# Patient Record
Sex: FEMALE | Race: BLACK OR AFRICAN AMERICAN
[De-identification: names, ages, dates, MRNs, and addresses within clinical notes are randomized per-mention and may not be internally consistent; named-entity substitution may affect disease eponyms.]

---

## 2017-06-04 ENCOUNTER — HOSPITAL ENCOUNTER (EMERGENCY)
Dept: HOSPITAL 63 - ER | Age: 37
Discharge: HOME | End: 2017-06-04
Payer: COMMERCIAL

## 2017-06-04 VITALS — WEIGHT: 187 LBS | HEIGHT: 64 IN | BODY MASS INDEX: 31.92 KG/M2

## 2017-06-04 VITALS — SYSTOLIC BLOOD PRESSURE: 137 MMHG | DIASTOLIC BLOOD PRESSURE: 89 MMHG

## 2017-06-04 DIAGNOSIS — R42: Primary | ICD-10-CM

## 2017-06-04 DIAGNOSIS — E03.9: ICD-10-CM

## 2017-06-04 DIAGNOSIS — I10: ICD-10-CM

## 2017-06-04 LAB
AMPHETAMINE/METHAMPHETAMINE: (no result)
ANION GAP SERPL CALC-SCNC: 9 MMOL/L (ref 6–14)
APTT PPP: YELLOW S
BACTERIA #/AREA URNS HPF: (no result) /HPF
BARBITURATES UR-MCNC: (no result) UG/ML
BASOPHILS # BLD AUTO: 0.1 X10^3/UL (ref 0–0.2)
BASOPHILS NFR BLD: 1 % (ref 0–3)
BENZODIAZ UR-MCNC: (no result) UG/L
BILIRUB UR QL STRIP: (no result)
CA-I SERPL ISE-MCNC: 12 MG/DL (ref 7–20)
CALCIUM SERPL-MCNC: 8.7 MG/DL (ref 8.5–10.1)
CANNABINOIDS UR-MCNC: (no result) UG/L
CHLORIDE SERPL-SCNC: 105 MMOL/L (ref 98–107)
CO2 SERPL-SCNC: 25 MMOL/L (ref 21–32)
COCAINE UR-MCNC: (no result) NG/ML
CREAT SERPL-MCNC: 0.9 MG/DL (ref 0.6–1)
EOSINOPHIL NFR BLD: 0.1 X10^3/UL (ref 0–0.7)
EOSINOPHIL NFR BLD: 2 % (ref 0–3)
ERYTHROCYTE [DISTWIDTH] IN BLOOD BY AUTOMATED COUNT: 14.7 % (ref 11.5–14.5)
FIBRINOGEN PPP-MCNC: (no result) MG/DL
GFR SERPLBLD BASED ON 1.73 SQ M-ARVRAT: 84.8 ML/MIN
GLUCOSE SERPL-MCNC: 104 MG/DL (ref 70–99)
GLUCOSE UR STRIP-MCNC: (no result) MG/DL
HCT VFR BLD CALC: 31.5 % (ref 36–47)
HGB BLD-MCNC: 10.6 G/DL (ref 12–15.5)
LYMPHOCYTES # BLD: 1.7 X10^3/UL (ref 1–4.8)
LYMPHOCYTES NFR BLD AUTO: 33 % (ref 24–48)
MCH RBC QN AUTO: 30 PG (ref 25–35)
MCHC RBC AUTO-ENTMCNC: 34 G/DL (ref 31–37)
MCV RBC AUTO: 88 FL (ref 79–100)
METHADONE SERPL-MCNC: (no result) NG/ML
MONO #: 0.3 X10^3/UL (ref 0–1.1)
MONOCYTES NFR BLD: 6 % (ref 0–9)
NEUT #: 3 X10^3UL (ref 1.8–7.7)
NEUTROPHILS NFR BLD AUTO: 59 % (ref 31–73)
NITRITE UR QL STRIP: (no result)
OPIATES UR-MCNC: (no result) NG/ML
PCP SERPL-MCNC: (no result) MG/DL
PLATELET # BLD AUTO: 238 X10^3/UL (ref 140–400)
POTASSIUM SERPL-SCNC: 3.5 MMOL/L (ref 3.5–5.1)
RBC # BLD AUTO: 3.58 X10^6/UL (ref 3.5–5.4)
RBC #/AREA URNS HPF: 0 /HPF (ref 0–2)
SODIUM SERPL-SCNC: 139 MMOL/L (ref 136–145)
SP GR UR STRIP: 1.02
SQUAMOUS #/AREA URNS LPF: (no result) /LPF
UROBILINOGEN UR-MCNC: 0.2 MG/DL
WBC # BLD AUTO: 5.1 X10^3/UL (ref 4–11)
WBC #/AREA URNS HPF: (no result) /HPF (ref 0–4)

## 2017-06-04 PROCEDURE — 96360 HYDRATION IV INFUSION INIT: CPT

## 2017-06-04 PROCEDURE — 81025 URINE PREGNANCY TEST: CPT

## 2017-06-04 PROCEDURE — 36415 COLL VENOUS BLD VENIPUNCTURE: CPT

## 2017-06-04 PROCEDURE — 80305 DRUG TEST PRSMV DIR OPT OBS: CPT

## 2017-06-04 PROCEDURE — 84484 ASSAY OF TROPONIN QUANT: CPT

## 2017-06-04 PROCEDURE — 80048 BASIC METABOLIC PNL TOTAL CA: CPT

## 2017-06-04 PROCEDURE — 81001 URINALYSIS AUTO W/SCOPE: CPT

## 2017-06-04 PROCEDURE — 85027 COMPLETE CBC AUTOMATED: CPT

## 2017-06-04 PROCEDURE — 93005 ELECTROCARDIOGRAM TRACING: CPT

## 2017-06-04 PROCEDURE — 71020: CPT

## 2017-06-04 PROCEDURE — 80320 DRUG SCREEN QUANTALCOHOLS: CPT

## 2017-06-04 NOTE — EKG
[facility] [address], [place] [address]

Test Date:    2017               Test Time:    13:51:40

Pat Name:     TASHA BLANCO          Department:   

Patient ID:   [id number]           Room:          

Gender:       F                        Technician:   MIO

:          1980               Requested By: ANGIE LANTIGUA

Order Number: [id number]            Reading MD:   Ag Cool

                                 Measurements

Intervals                              Axis          

Rate:         64                       P:            43

HI:           130                      QRS:          58

QRSD:         80                       T:            30

QT:           396                                    

QTc:          408                                    

                           Interpretive Statements

SINUS RHYTHM

NONSPECIFIC ST-T WAVE CHANGES.

RI6.01          Unconfirmed report

Compared to ECG 2015 21:01:39

No significant changes



Electronically Signed On 2017 9:53:05 CDT by Ag Cool

## 2017-06-04 NOTE — ED.ADGEN
Past History


Past Medical History:  Hypertension, Hyperthyroid


Past Surgical History:  


Alcohol Use:  None


Drug Use:  None





Adult General


HPI


HPI





Patient is a 37-year-old woman, history of hypertension is medically managed, 

who presents emergency Department with a complaint of lightheadedness. Patient 

states that she become increasingly lightheaded are the past 3 days. She states 

that the sensation is worse with standing and moving from a lying to seated 

position. She states that she has remained well-hydrated, denies any chest pain

, any shortness of breath, any nausea or vomiting, any focal weakness, numbness

, tingling, vision changes, travel, procedures, ingestions, exposures, swelling 

extremities, rashes or other concerning symptoms. States that this is similar 

to when she was diagnosed with high blood pressure, however blood pressure hasn'

t well controlled with oral medication. She states she is eating and drinking 

well. Denies any seasonal allergy or respiratory type symptoms. Denies any drugs

, alcohol or cigarettes. Patient states that there is a possibility she could 

be pregnant.





Review of Systems


Review of Systems





Constitutional: Denies fever or chills [] lightheadedness.


Eyes: Denies change in visual acuity, redness, or eye pain []


HENT: Denies nasal congestion or sore throat []


Respiratory: Denies cough or shortness of breath []


Cardiovascular: No additional information not addressed in HPI []


GI: Denies abdominal pain, nausea, vomiting, bloody stools or diarrhea []


: Denies dysuria or hematuria []


Musculoskeletal: Denies back pain or joint pain []


Integument: Denies rash or skin lesions []


Neurologic: Denies headache, focal weakness or sensory changes []


Endocrine: Denies polyuria or polydipsia []





Current Medications


Current Medications





Current Medications








 Medications


  (Trade)  Dose


 Ordered  Sig/Eliseo  Start Time


 Stop Time Status Last Admin


Dose Admin


 


 Sodium Chloride  1,000 ml @ 


 1,000 mls/hr  1X  ONCE  17 13:45


 17 14:44 DC 17 14:15


1,000 MLS/HR











Allergies


Allergies





Allergies








Coded Allergies Type Severity Reaction Last Updated Verified


 


  No Known Drug Allergies    3/3/15 No











Physical Exam


Physical Exam





Constitutional: Well developed, well nourished, no acute distress, non-toxic 

appearance. []


HENT: Normocephalic, atraumatic, bilateral external ears normal, oropharynx 

moist, no oral exudates, nose normal. []


Eyes: PERRLA, EOMI, conjunctiva normal, no discharge. [] 


Neck: Normal range of motion, no tenderness, supple, no stridor. [] 


Cardiovascular:Heart rate regular rhythm, no murmur, S1, S2, no rubs or 

gallops. []


Lungs & Thorax:  Bilateral breath sounds clear to auscultation , no wheezing, 

rhonchi, rales. No chest or crepitus or tenderness. []


Abdomen: Bowel sounds normal, soft, no tenderness, no masses, no pulsatile 

masses. [] 


Skin: Warm, dry, no erythema, no rash. [] 


Back: No tenderness, no CVA tenderness. [] 


Extremities: No tenderness, no cyanosis, no clubbing, ROM intact, no edema. 

Negative Homans sign. [] 


Neurologic: Alert and oriented X 3, normal motor function, normal sensory 

function, no focal deficits noted. []


Psychologic: Affect normal, judgement normal, mood normal. []





Current Patient Data


Vital Signs





 Vital Signs








  Date Time  Temp Pulse Resp B/P (MAP) Pulse Ox O2 Delivery O2 Flow Rate FiO2


 


17 15:10  65 18 137/89 (105) 100 Room Air  


 


17 12:55 98.5       








Lab Results





 Laboratory Tests








Test


  17


13:00 17


13:55


 


Urine Collection Type Unknown   


 


Urine Color Yellow   


 


Urine Clarity Hazy   


 


Urine pH 5.5   


 


Urine Specific Gravity 1.020   


 


Urine Protein


  Trace


(NEG-TRACE) 


 


 


Urine Glucose (UA)


  Neg mg/dL


(NEG) 


 


 


Urine Ketones (Stick)


  Trace mg/dL


(NEG) 


 


 


Urine Blood Trace (NEG)   


 


Urine Nitrite Neg (NEG)   


 


Urine Bilirubin Neg (NEG)   


 


Urine Urobilinogen Dipstick


  0.2 mg/dL (0.2


mg/dL) 


 


 


Urine Leukocyte Esterase Neg (NEG)   


 


Urine RBC 0 /HPF (0-2)   


 


Urine WBC


  Rare /HPF


(0-4) 


 


 


Urine Squamous Epithelial


Cells Few /LPF  


  


 


 


Urine Bacteria


  Few /HPF


(0-FEW) 


 


 


Urine Mucus Mod /LPF   


 


Urine Opiates Screen Neg (NEG)   


 


Urine Methadone Screen Neg (NEG)   


 


Urine Barbiturates Neg (NEG)   


 


Urine Phencyclidine Screen Neg (NEG)   


 


Urine


Amphetamine/Methamphetamine Neg (NEG)  


  


 


 


Urine Benzodiazepines Screen Neg (NEG)   


 


Urine Cocaine Screen Neg (NEG)   


 


Urine Cannabinoids Screen Pos (NEG)   


 


Urine Ethyl Alcohol Neg (NEG)   


 


White Blood Count


  


  5.1 x10^3/uL


(4.0-11.0)


 


Red Blood Count


  


  3.58 x10^6/uL


(3.50-5.40)


 


Hemoglobin


  


  10.6 g/dL


(12.0-15.5)  L


 


Hematocrit


  


  31.5 %


(36.0-47.0)  L


 


Mean Corpuscular Volume


  


  88 fL ()


 


 


Mean Corpuscular Hemoglobin  30 pg (25-35)  


 


Mean Corpuscular Hemoglobin


Concent 


  34 g/dL


(31-37)


 


Red Cell Distribution Width


  


  14.7 %


(11.5-14.5)  H


 


Platelet Count


  


  238 x10^3/uL


(140-400)


 


Neutrophils (%) (Auto)  59 % (31-73)  


 


Lymphocytes (%) (Auto)  33 % (24-48)  


 


Monocytes (%) (Auto)  6 % (0-9)  


 


Eosinophils (%) (Auto)  2 % (0-3)  


 


Basophils (%) (Auto)  1 % (0-3)  


 


Neutrophils # (Auto)


  


  3.0 x10^3uL


(1.8-7.7)


 


Lymphocytes # (Auto)


  


  1.7 x10^3/uL


(1.0-4.8)


 


Monocytes # (Auto)


  


  0.3 x10^3/uL


(0.0-1.1)


 


Eosinophils # (Auto)


  


  0.1 x10^3/uL


(0.0-0.7)


 


Basophils # (Auto)


  


  0.1 x10^3/uL


(0.0-0.2)


 


Sodium Level


  


  139 mmol/L


(136-145)


 


Potassium Level


  


  3.5 mmol/L


(3.5-5.1)


 


Chloride Level


  


  105 mmol/L


()


 


Carbon Dioxide Level


  


  25 mmol/L


(21-32)


 


Anion Gap  9 (6-14)  


 


Blood Urea Nitrogen


  


  12 mg/dL


(7-20)


 


Creatinine


  


  0.9 mg/dL


(0.6-1.0)


 


Estimated GFR


(Cockcroft-Gault) 


  84.8  


 


 


Glucose Level


  


  104 mg/dL


(70-99)  H


 


Calcium Level


  


  8.7 mg/dL


(8.5-10.1)


 


Troponin I Quantitative


  


  < 0.017 ng/mL


(0-0.055)











EKG


EKG


EC: Sinus rhythm, heart rate 64 bpm, upright axis, QTC of 408, SD of 1:30

, QRS of 80, contrary normalities noted in the anterior septal leads, but no ST 

elevations or depressions, some flattening in lead 3 also noted, does not meet 

STEMI criteria. As interpreted by me. []





Radiology/Procedures


Radiology/Procedures


[]


Impressions:


[facility] [address], [place] [address]


 [phone]


 


 IMAGING REPORT





 Signed





PATIENT: TASHA BLANCO ACCOUNT: [id number] MRN#: [id number]


: 1980 LOCATION: ER AGE: 37


SEX: F EXAM DT: 17 ACCESSION#: [id number]


STATUS: PRE ER ORD. PHYSICIAN: ANGIE LANTIGUA DO 


REASON: Dizziness


PROCEDURE: CHEST PA & LATERAL





Indication: Dizziness and high blood pressure.





Time of exam 1411 hours.





FINDINGS: The heart size is normal. The lungs are clear. No pleural effusion


or pneumothorax is identified.  The pulmonary vascularity is normal.





IMPRESSION: No acute abnormality detected. 




















DICTATED AND SIGNED BY:     TAY BANKS MD


DATE:     17





CC: ANGIE LANTIGUA DO; NIECY LEWIS MD ~





Course & Med Decision Making


Course & Med Decision Making


Pertinent Labs and Imaging studies reviewed. (See chart for details)





HCG is negative and the emergency department. Patient complaining of near-

syncope, although she has no reports of chest pain, shortness of breath, or 

other concerning personal or family history regarding a concerning cardiac or 

pulmonary cause. However after discussion at bedside, due to the length patient'

s symptoms will obtain laboratory studies, chest x-ray, ECG and administer IV 

fluids. Patient with positive orthostatics in the emergency department, so 

liter of normal saline, laboratory studies and imaging not reveal any evidence 

of concerning findings, after receiving the liter of fluid patient states that 

she is feeling much better, is no longer experiencing lightheadedness. 

Discussed with her that she also has some evidence of mild seasonal allergy 

symptoms, recommend use of over-the-counter medications such as fluticasone or 

loratadine to treat and prevent symptoms, and address the importance of staying 

well-hydrated, drink plenty of clear fluids. Patient voiced understanding and 

agreement with these instructions, states she is ready to go home at this time, 

is ambulating without difficulty and without recurrence of symptoms in the ED. 

States that she will follow-up with her primary care provider for additional 

evaluation as needed, and will return to the ED for concerning symptoms as 

discussed us. Patient discharged home in stable condition with plan as above.





Final Impression


Final Impression


[]


Problems:  


(1) Lightheadedness





Dragon Disclaimer


Dragon Disclaimer


This electronic medical record was generated, in whole or in part, using a 

voice recognition dictation system.











ANGIE LANTIGUA DO 2017 19:02

## 2017-06-04 NOTE — RAD
Indication: Dizziness and high blood pressure.



Time of exam 1411 hours.



FINDINGS: The heart size is normal. The lungs are clear. No pleural effusion

or pneumothorax is identified.  The pulmonary vascularity is normal.



IMPRESSION: No acute abnormality detected.

## 2017-06-08 ENCOUNTER — HOSPITAL ENCOUNTER (EMERGENCY)
Dept: HOSPITAL 63 - ER | Age: 37
Discharge: HOME | End: 2017-06-08
Payer: COMMERCIAL

## 2017-06-08 VITALS — DIASTOLIC BLOOD PRESSURE: 80 MMHG | SYSTOLIC BLOOD PRESSURE: 139 MMHG

## 2017-06-08 VITALS — HEIGHT: 64 IN | BODY MASS INDEX: 28.51 KG/M2 | WEIGHT: 167 LBS

## 2017-06-08 DIAGNOSIS — K52.9: Primary | ICD-10-CM

## 2017-06-08 LAB
ALBUMIN SERPL-MCNC: 3.6 G/DL (ref 3.4–5)
ALBUMIN/GLOB SERPL: 0.8 {RATIO} (ref 1–1.7)
ALP SERPL-CCNC: 82 U/L (ref 46–116)
ALT SERPL-CCNC: 21 U/L (ref 14–59)
ANION GAP SERPL CALC-SCNC: 9 MMOL/L (ref 6–14)
AST SERPL-CCNC: 13 U/L (ref 15–37)
BASOPHILS # BLD AUTO: 0.1 X10^3/UL (ref 0–0.2)
BASOPHILS NFR BLD: 1 % (ref 0–3)
BILIRUB SERPL-MCNC: 0.1 MG/DL (ref 0.2–1)
BUN/CREAT SERPL: 18 (ref 6–20)
CA-I SERPL ISE-MCNC: 14 MG/DL (ref 7–20)
CALCIUM SERPL-MCNC: 8.8 MG/DL (ref 8.5–10.1)
CHLORIDE SERPL-SCNC: 105 MMOL/L (ref 98–107)
CO2 SERPL-SCNC: 24 MMOL/L (ref 21–32)
CREAT SERPL-MCNC: 0.8 MG/DL (ref 0.6–1)
EOSINOPHIL NFR BLD: 0 % (ref 0–3)
EOSINOPHIL NFR BLD: 0 X10^3/UL (ref 0–0.7)
ERYTHROCYTE [DISTWIDTH] IN BLOOD BY AUTOMATED COUNT: 14.2 % (ref 11.5–14.5)
GFR SERPLBLD BASED ON 1.73 SQ M-ARVRAT: 97.1 ML/MIN
GLOBULIN SER-MCNC: 4.6 G/DL (ref 2.2–3.8)
GLUCOSE SERPL-MCNC: 112 MG/DL (ref 70–99)
HCT VFR BLD CALC: 33.8 % (ref 36–47)
HGB BLD-MCNC: 11.4 G/DL (ref 12–15.5)
LIPASE: 141 U/L (ref 73–393)
LYMPHOCYTES # BLD: 1.2 X10^3/UL (ref 1–4.8)
LYMPHOCYTES NFR BLD AUTO: 15 % (ref 24–48)
MCH RBC QN AUTO: 30 PG (ref 25–35)
MCHC RBC AUTO-ENTMCNC: 34 G/DL (ref 31–37)
MCV RBC AUTO: 88 FL (ref 79–100)
MONO #: 0.3 X10^3/UL (ref 0–1.1)
MONOCYTES NFR BLD: 3 % (ref 0–9)
NEUT #: 6.4 X10^3UL (ref 1.8–7.7)
NEUTROPHILS NFR BLD AUTO: 81 % (ref 31–73)
PLATELET # BLD AUTO: 287 X10^3/UL (ref 140–400)
POTASSIUM SERPL-SCNC: 3.6 MMOL/L (ref 3.5–5.1)
PROT SERPL-MCNC: 8.2 G/DL (ref 6.4–8.2)
RBC # BLD AUTO: 3.85 X10^6/UL (ref 3.5–5.4)
SODIUM SERPL-SCNC: 138 MMOL/L (ref 136–145)
WBC # BLD AUTO: 8 X10^3/UL (ref 4–11)

## 2017-06-08 PROCEDURE — 36415 COLL VENOUS BLD VENIPUNCTURE: CPT

## 2017-06-08 PROCEDURE — 99285 EMERGENCY DEPT VISIT HI MDM: CPT

## 2017-06-08 PROCEDURE — 96361 HYDRATE IV INFUSION ADD-ON: CPT

## 2017-06-08 PROCEDURE — 85027 COMPLETE CBC AUTOMATED: CPT

## 2017-06-08 PROCEDURE — S0028 INJECTION, FAMOTIDINE, 20 MG: HCPCS

## 2017-06-08 PROCEDURE — 96375 TX/PRO/DX INJ NEW DRUG ADDON: CPT

## 2017-06-08 PROCEDURE — 83690 ASSAY OF LIPASE: CPT

## 2017-06-08 PROCEDURE — 80053 COMPREHEN METABOLIC PANEL: CPT

## 2017-06-08 PROCEDURE — 84484 ASSAY OF TROPONIN QUANT: CPT

## 2017-06-08 PROCEDURE — 74022 RADEX COMPL AQT ABD SERIES: CPT

## 2017-06-08 PROCEDURE — 96374 THER/PROPH/DIAG INJ IV PUSH: CPT

## 2017-06-08 PROCEDURE — 81025 URINE PREGNANCY TEST: CPT

## 2017-06-08 NOTE — PHYS DOC
General


Chief Complaint:  ABDOMINAL PAIN


Stated Complaint:  N/V/D


Time Seen by MD:  10:24


Source:  patient


Exam Limitations:  no limitations


Problems:  





History of Present Illness


Initial Comments


Patient is a 37-year-old female who comes the ED complaining of nausea vomiting 

and diarrhea.


Patient states she had a pizza frozen pop or James's her son may last night 

which she didn't think was "all the way through. She her son and some others 

ate a cheesy only one ill today. She states she woke up this morning with 

epigastric discomfort and nausea, states she went to the restroom and had 

multiple episodes of diarrhea and vomiting. States she's had at least 15 

episodes of loose watery stools she seen no blood in her stools or emesis. She 

denies focal abdominal pain complaints just generalized discomfort typically 

relieved with bowel movement or emesis. No fever chills sweats or myalgias no 

travel or other exotic or uncooked by mouth. No pre-arrival treatment patient 

is not vomiting and has no loose stools to this point in the ED.


Timing/Duration:  24 hours


Severity:  moderate


Modifying Factors:  worse with eating, improves with medication


Associated Symptoms:  nausea/vomiting


Allergies:  


Coded Allergies:  


     No Known Drug Allergies (Unverified , 3/3/15)





Past Medical History


Medical History:  no pertinent history


Surgical History:  no surgical history





Family History


Significant Family History:  no pertinent family hx





Review of Systems


Constitutional:  denies chills, denies diaphoresis, denies fever, denies malaise


Respiratory:  denies cough, denies shortness of breath


Cardiovascular:  denies chest pain, denies palpitations


Gastrointestinal:  see HPI


Genitourinary:  denies discharge, denies dysuria, denies frequency, denies 

hematuria


Musculoskeletal:  denies back pain, denies joint swelling, denies neck pain


Psychiatric/Neurological:  denies headache, denies numbness, denies paresthesia





Physical Exam


General Appearance:  WD/WN, mild distress


Eyes:  bilateral eye normal inspection, bilateral eye PERRL, bilateral eye EOMI


Ear, Nose, Throat:  hearing grossly normal, normal ENT inspection, normal 

pharynx


Neck:  non-tender, supple


Respiratory:  normal breath sounds, no respiratory distress


Cardiovascular:  normal peripheral pulses, regular rate, rhythm


Gastrointestinal:  normal bowel sounds, soft (nondistended, generalized 

tenderness to palpation without rebound or guarding or mass negative McBurney 

negative James)


Back:  no CVA tenderness, no vertebral tenderness


Extremities:  non-tender, normal inspection


Neurologic/Psychiatric:  CNs II-XII nml as tested, no motor/sensory deficits, 

alert, normal mood/affect, oriented x 3


Skin:  normal color, warm/dry





Orders, Labs, Meds





PATIENT: TASHA BLANCO ACCOUNT: [id number] MRN#: [id number]


: 1980 LOCATION: ER AGE: 37


SEX: F EXAM DT: 17 ACCESSION#: [id number]


STATUS: REG ER ORD. PHYSICIAN: CORWIN FARIA DO 


REASON: n/v/d


PROCEDURE: ACUTE ABDOMEN SERIES





Acute abdomen series with chest, 3 views, 2017:





History: Abdominal pain, nausea, vomiting, diarrhea





There scattered air-fluid levels in the GI tract. These appear to lie


predominantly within large bowel. The visualized bowel loops are not


significantly distended. No free air is seen in the abdomen. There is no


evidence of organomegaly. Lower pelvic calcifications are compatible with


phleboliths. There is a mild thoracolumbar scoliosis.





The heart size is normal. The lungs are clear. There is no evidence of pleural


fluid.








IMPRESSION: Scattered air-fluid levels in the colon compatible with history


the of diarrhea and/or an ileus.














DICTATED AND SIGNED BY:     MORITZ,RICK S MD


DATE:     17 1132





CC: GRAHAM TRINIDAD; CORWIN FARIA DO ~








Labs unremarkable no urine specimen submitted.





I discussed findings with the patient overall reassuring. She states she is 

feeling much better with IV fluids and medications and is ready for discharge. 

She expressed agreement and understanding with the treatment plan.


Departure


Time of Disposition:  12:10


Disposition:  01 HOME, SELF-CARE


Diagnosis:  gastroenteritis, likely viral


Condition:  GOOD


Patient Instructions:  Viral Gastroenteritis, Easy-to-Read





Additional Instructions:  


Off work through , note given.


Rest, no strenuous activity. Aggressive hydration with Gatorade or water.


Clear liquids, advance to bland diet tomorrow slowly as tolerated.


Prescription: Zofran ODT, dicyclomine take as directed.


Follow-up with your doctor in 5-7 days if not better.


Return to the ED with new or changing symptoms.











CORWIN FARIA DO 2017 10:58

## 2017-06-08 NOTE — RAD
Acute abdomen series with chest, 3 views, 6/8/2017:



History: Abdominal pain, nausea, vomiting, diarrhea



There scattered air-fluid levels in the GI tract. These appear to lie

predominantly within large bowel. The visualized bowel loops are not

significantly distended. No free air is seen in the abdomen. There is no

evidence of organomegaly. Lower pelvic calcifications are compatible with

phleboliths. There is a mild thoracolumbar scoliosis.



The heart size is normal. The lungs are clear. There is no evidence of pleural

fluid.





IMPRESSION: Scattered air-fluid levels in the colon compatible with history

the of diarrhea and/or an ileus.

## 2017-08-12 ENCOUNTER — HOSPITAL ENCOUNTER (EMERGENCY)
Dept: HOSPITAL 63 - ER | Age: 37
Discharge: HOME | End: 2017-08-12
Payer: COMMERCIAL

## 2017-08-12 VITALS — WEIGHT: 197 LBS | HEIGHT: 64 IN | BODY MASS INDEX: 33.63 KG/M2

## 2017-08-12 VITALS — DIASTOLIC BLOOD PRESSURE: 79 MMHG | SYSTOLIC BLOOD PRESSURE: 129 MMHG
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
## 2017-08-12 DIAGNOSIS — S00.261A: Primary | ICD-10-CM

## 2017-08-12 DIAGNOSIS — E05.90: ICD-10-CM
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
## 2017-08-12 DIAGNOSIS — I10: ICD-10-CM

## 2017-08-12 PROCEDURE — 99281 EMR DPT VST MAYX REQ PHY/QHP: CPT

## 2017-08-12 NOTE — PHYS DOC
Past History


Past Medical History:  Hypertension, Hyperthyroid


Past Surgical History:  


Alcohol Use:  None


Drug Use:  None





Adult General


Chief Complaint


Chief Complaint:  EYE PROBLEMS





HPI


HPI





Patient is a 37 year old F who presents with itching on the skin around her 

right eye. She feels that she had an insect bite on her eyelid which has caused 

redness swelling and itchiness. She has no visual problems or other associated 

symptoms at this time





Review of Systems


Review of Systems





Constitutional: Denies fever or chills []


Eyes: Denies change in visual acuity, redness, or eye pain []


HENT: Denies nasal congestion or sore throat []


Respiratory: Denies cough or shortness of breath []


Cardiovascular: No additional information not addressed in HPI []


GI: Denies abdominal pain, nausea, vomiting, bloody stools or diarrhea []


: Denies dysuria or hematuria []


Musculoskeletal: Denies back pain or joint pain []


Integument: Negative except history of present illness


Neurologic: Denies headache, focal weakness or sensory changes []


Endocrine: Denies polyuria or polydipsia []





Current Medications


Current Medications


Reviewed





Allergies


Allergies





Allergies








Coded Allergies Type Severity Reaction Last Updated Verified


 


  No Known Drug Allergies    3/3/15 No











Physical Exam


Physical Exam





Constitutional: Well developed, well nourished, no acute distress, non-toxic 

appearance. []


HENT: Normocephalic, atraumatic, bilateral external ears normal, oropharynx 

moist, no oral exudates, nose normal. []


Eyes: PERRLA, EOMI, conjunctiva normal, no discharge. [] Mild swelling and 

erythema of the right upper eyelid


Neck: Normal range of motion, no tenderness, supple, no stridor. [] 


Cardiovascular:Heart rate regular rhythm, no murmur []


Lungs & Thorax:  Bilateral breath sounds clear to auscultation []


Abdomen: Bowel sounds normal, soft, no tenderness, no masses, no pulsatile 

masses. [] 


Skin: Warm, dry, no erythema, no rash. [] 


Back: No tenderness, no CVA tenderness. [] 


Extremities: No tenderness, no cyanosis, no clubbing, ROM intact, no edema. [] 


Neurologic: Alert and oriented X 3, normal motor function, normal sensory 

function, no focal deficits noted. []


Psychologic: Affect normal, judgement normal, mood normal. []





EKG


EKG


[]





Radiology/Procedures


Radiology/Procedures


[]





Course & Med Decision Making


Course & Med Decision Making


Pertinent Labs and Imaging studies reviewed. (See chart for details)





[]





Dragon Disclaimer


Dragon Disclaimer


This chart was dictated in whole or in part using Voice Recognition software in 

a busy, high-work load, and often noisy Emergency Department environment.  It 

may contain unintended and wholly unrecognized errors or omissions.





Departure


Departure:


Impression:  


 Primary Impression:  


 Insect bite


Disposition:   HOME, SELF-CARE


Condition:  STABLE


Referrals:  


GRAHAM TRINIDAD (PCP)


Patient Instructions:  Insect Bite





Additional Instructions:  


Alia seen in the emergency room for an insect bite. No emergency medical 

condition was found. She was discharged in stable condition with recommendation 

to follow-up with her primary care doctor as needed





Problem Qualifiers








 Primary Impression:  


 Insect bite


 Encounter type:  initial encounter  Qualified Codes:  W57.XXXA - Bitten or 

stung by nonvenomous insect and other nonvenomous arthropods, initial encounter








SOFY ERAZO MD Aug 12, 2017 13:11

## 2017-10-08 ENCOUNTER — HOSPITAL ENCOUNTER (EMERGENCY)
Dept: HOSPITAL 63 - ER | Age: 37
Discharge: HOME | End: 2017-10-08
Payer: COMMERCIAL

## 2017-10-08 VITALS — SYSTOLIC BLOOD PRESSURE: 136 MMHG | DIASTOLIC BLOOD PRESSURE: 81 MMHG

## 2017-10-08 DIAGNOSIS — L02.412: Primary | ICD-10-CM

## 2017-10-08 DIAGNOSIS — Z88.6: ICD-10-CM

## 2017-10-08 DIAGNOSIS — E03.9: ICD-10-CM

## 2017-10-08 DIAGNOSIS — I10: ICD-10-CM

## 2017-10-08 DIAGNOSIS — Z88.5: ICD-10-CM

## 2017-10-08 PROCEDURE — 87070 CULTURE OTHR SPECIMN AEROBIC: CPT

## 2017-10-08 PROCEDURE — 99283 EMERGENCY DEPT VISIT LOW MDM: CPT

## 2018-01-04 ENCOUNTER — HOSPITAL ENCOUNTER (EMERGENCY)
Dept: HOSPITAL 63 - ER | Age: 38
Discharge: HOME | End: 2018-01-04
Payer: COMMERCIAL

## 2018-01-04 VITALS — DIASTOLIC BLOOD PRESSURE: 105 MMHG | SYSTOLIC BLOOD PRESSURE: 150 MMHG

## 2018-01-04 VITALS — BODY MASS INDEX: 33.63 KG/M2 | WEIGHT: 197 LBS | HEIGHT: 64 IN

## 2018-01-04 DIAGNOSIS — F41.0: Primary | ICD-10-CM

## 2018-01-04 DIAGNOSIS — E05.90: ICD-10-CM

## 2018-01-04 DIAGNOSIS — I10: ICD-10-CM

## 2018-01-04 DIAGNOSIS — Z88.6: ICD-10-CM

## 2018-01-04 DIAGNOSIS — Z88.5: ICD-10-CM

## 2018-01-04 PROCEDURE — 93005 ELECTROCARDIOGRAM TRACING: CPT

## 2018-01-04 NOTE — PHYS DOC
Past History


Past Medical History:  Anxiety, Hypertension, Hyperthyroid


Past Surgical History:  No Surgical History, 


Alcohol Use:  None


Drug Use:  None





Adult General


Chief Complaint


Chief Complaint:  CHEST PAIN





[facility]


HPI


37-year-old female patient states she has had intermittent episodes of 

substernal chest pain as an aching pain without radiation, shortness of breath, 

nausea, fever, palpitation, dizziness since yesterday that last about few 

minutes as a mild pain. Patient stated she had 2 episodes of chest pain 

yesterday and 2 episodes today. Patient states she became panicky after her 

chest pain yesterday that resolved spontaneously. Patient states she checked 

her blood pressure today and it was 150/93 and became panic about her high 

blood pressure and decided to come to ER. Patient denies any chest pain at the 

same time. Patient has had history of hypertension and taking 2 medications for 

hypertension.





Review of Systems


Review of Systems





Constitutional: Denies fever or chills []


Eyes: Denies change in visual acuity, redness, or eye pain []


HENT: Denies nasal congestion or sore throat []


Respiratory: Denies cough or shortness of breath []


Cardiovascular: No additional information not addressed in HPI []


GI: Denies abdominal pain, nausea, vomiting, bloody stools or diarrhea []


: Denies dysuria or hematuria []


Musculoskeletal: Denies back pain or joint pain []


Integument: Denies rash or skin lesions []


Neurologic: Denies headache, focal weakness or sensory changes []


Endocrine: Denies polyuria or polydipsia []





All other systems were reviewed and found to be within normal limits, except as 

documented in this note.





Allergies


Allergies





Allergies








Coded Allergies Type Severity Reaction Last Updated Verified


 


  acetaminophen Allergy Unknown rash 10/8/17 Yes


 


  oxycodone Allergy Unknown rash 10/8/17 Yes











Physical Exam


Physical Exam





Constitutional: Well developed, well nourished, no acute distress, non-toxic 

appearance, anxious. []


HENT: Normocephalic, atraumatic, bilateral external ears normal, oropharynx 

moist, no oral exudates, nose normal. []


Eyes: PERRLA, EOMI, conjunctiva normal, no discharge. [] 


Neck: Normal range of motion, no tenderness, supple, no stridor. [] 


Cardiovascular:Heart rate regular rhythm, no murmur []


Lungs & Thorax:  Bilateral breath sounds clear to auscultation []


Abdomen: Bowel sounds normal, soft, no tenderness, no masses, no pulsatile 

masses. [] 


Skin: Warm, dry, no erythema, no rash. [] 


Back: No tenderness, no CVA tenderness. [] 


Extremities: No tenderness, no cyanosis, no clubbing, ROM intact, no edema. [] 


Neurologic: Alert and oriented X 3, normal motor function, normal sensory 

function, no focal deficits noted. []


Psychologic: Affect normal, judgement normal, mood normal. []





Current Patient Data


Vital Signs





 Vital Signs








  Date Time  Temp Pulse Resp B/P (MAP) Pulse Ox O2 Delivery O2 Flow Rate FiO2


 


18 13:54 98.2 81 18  100 Room Air  











EKG


EKG


EKG interpreted by me. EKG at 1359 showed normal sinus rhythm at rate of 78, no 

ST and T wave abnormality.[]





Radiology/Procedures


Radiology/Procedures


[]





Course & Med Decision Making


Course & Med Decision Making


Evaluation of patient in ER showed 37-year-old male patient with history of 

hypertension complaining of elevation of blood pressure of  150/92 and 

intermittent episodes of chest pain since yesterday. Patient had unremarkable 

physical exam and EKG. Patient had blood pressure of 150 at arrival to ER that 

decreased to 134/91 spontaneously. Patient instructed to continue her home 

medication follow with her primary care physician. Patient did not want to have 

blood test.





Dragon Disclaimer


Dragon Disclaimer


This electronic medical record was generated, in whole or in part, using a 

voice recognition dictation system.





Departure


Departure:


Impression:  


 Primary Impression:  


 Uncontrolled hypertension


 Additional Impressions:  


 Panic attack


 Non-cardiac chest pain


Disposition:   HOME, SELF-CARE (At 1423)


Condition:  IMPROVED


Referrals:  


GRAHAM TRINIDAD (PCP)


Patient Instructions:  Anxiety and Panic Attacks, Hypertension





Additional Instructions:  


Continue current medication


Follow-up with your primary care physician in 2 or 3 days


Return to ER as needed





Problem Qualifiers











FLORIAN LEYVA MD 2018 14:34

## 2018-01-04 NOTE — EKG
[facility] [address], [place] [address]

Test Date:    2018               Test Time:    13:59:55

Pat Name:     TASHA BLANCO          Department:   

Patient ID:   [id number]           Room:          

Gender:       F                        Technician:   MIO

:          1980               Requested By: FLORIAN LEYVA

Order Number: [id number]            Reading MD:     

                                 Measurements

Intervals                              Axis          

Rate:         78                       P:            46

FL:           128                      QRS:          56

QRSD:         80                       T:            28

QT:           358                                    

QTc:          411                                    

                           Interpretive Statements

SINUS RHYTHM

NORMAL ECG

RI6.01          Unconfirmed report

No previous ECG available for comparison

## 2021-10-16 ENCOUNTER — HOSPITAL ENCOUNTER (EMERGENCY)
Dept: HOSPITAL 63 - ER | Age: 41
Discharge: HOME | End: 2021-10-16
Payer: SELF-PAY

## 2021-10-16 VITALS — HEIGHT: 64 IN | WEIGHT: 140.21 LBS | BODY MASS INDEX: 23.94 KG/M2

## 2021-10-16 VITALS — SYSTOLIC BLOOD PRESSURE: 190 MMHG | DIASTOLIC BLOOD PRESSURE: 82 MMHG

## 2021-10-16 DIAGNOSIS — I10: ICD-10-CM

## 2021-10-16 DIAGNOSIS — K02.9: Primary | ICD-10-CM

## 2021-10-16 PROCEDURE — 99283 EMERGENCY DEPT VISIT LOW MDM: CPT

## 2021-10-16 NOTE — PHYS DOC
Past History


Past Medical History:  Anxiety, Hypertension, Hyperthyroid


Past Surgical History:  No Surgical History, 


Alcohol Use:  None


Drug Use:  None





Adult General


HPI


HPI





Patient is a 41-year-old female presenting for abdominal pain.  She has history 

of poor dentition globally.  Reports developing tooth ache 2 days prior.  She 

has been taking ibuprofen as scheduled on bottle without significant improvement

in pain.  She has no income, reports that by the time she wanted to seek care 

from a dentist it was yesterday evening and they were all shot.  She is aware of

[facility] dental pain she plans to establish care with this upcoming week.  She 

is here for pain control.  No fever, phonation changes or trouble tolerating 

secretions





Review of Systems


Review of Systems


Fourteen body systems of review of systems have been reviewed. See HPI for 

pertinent positives and negative responses, other wise all other systems are 

negative, non-pertinent or non-contributory





Allergies


Allergies





Allergies








Coded Allergies Type Severity Reaction Last Updated Verified


 


  acetaminophen Allergy Unknown rash 10/8/17 Yes


 


  oxycodone Allergy Unknown rash 10/8/17 Yes











Physical Exam


Physical Exam


Constitutional: Well developed, well nourished, no acute distress, non-toxic 

appearance. 


HENT: Normocephalic, atraumatic, bilateral external ears normal, oropharynx 

moist with poor dentition globally, patient does have a broken tooth that is 

well-appearing otherwise and noninfected at position #21, no oral exudates, nose

normal. 


Eyes: PERRLA, EOMI, conjunctiva normal, no discharge.  


Neck: Normal range of motion, no tenderness, supple, no stridor.  


Cardiovascular: Heart rate regular per monitor


Lungs & Thorax: No respiratory distress or accessory muscle use, bilateral chest

rise


Abdomen: Abdomen soft, non-tender, bowel sounds present in all quadrants, no 

guarding or rebound, nonacute abdomen.


Skin: Warm, dry, no erythema, no rash.  


Back: No tenderness, no CVA tenderness.  


Extremities: No tenderness, no cyanosis, no clubbing, ROM intact, no edema.  


Neurologic: Alert and oriented X 3, grossly normal motor & sensory function, no 

focal deficits noted. 


Psychologic: Affect normal, judgement normal, mood normal.





EKG


EKG


[]





Radiology/Procedures


Radiology/Procedures


[]





Heart Score


C/O Chest Pain:  No


Risk Factors:


Risk Factors:  DM, Current or recent (<one month) smoker, HTN, HLP, family 

history of CAD, obesity.


Risk Scores:


Risk Factors:  DM, Current or recent (<one month) smoker, HTN, HLP, family 

history of CAD, obesity.





Course & Med Decision Making


Course & Med Decision Making


ABCs unremarkable


HPI physical exam unremarkable for any emergent or surgical issues


No active dental infection at this time.  Patient taking ibuprofen only, admits 

she is allergic to Percocet as it caused hives.  She has taken Lortab in the 

past.  I feel an extremely small dose of prescribed medication will benefit her 

until she can be seen by dentist for further/definitive management


Strict return precautions discussed with good understanding by patient, all 

questions and concerns addressed prior to ER departure





Cherise Disclaimer


Dragon Disclaimer


This electronic medical record was generated, in whole or in part, using a voice

 recognition dictation system.





Departure


Departure:


Impression:  


   Primary Impression:  


   Pain due to dental caries


Disposition:   HOME / SELF CARE / HOMELESS


Condition:  STABLE


Referrals:  


PCP,LOY (PCP)


Patient Instructions:  Dental Pain





Additional Instructions:  


You were seen for dental pain.  There does not appear to be any infection at 

this time.  Take Ibuprofen (600-800mg) and Tylenol (500-650mg) alternating every

 4-6 hours to help with inflammation and pain while you contact a dentist for 

further care.  You should return to the ED if you develop worsening pain, fever 

> 101, swelling, redness, or any other new or concerning symptoms.  

Unfortunately, your pain is not likely to improve without seeing a dentist for 

further evaluation and treatment of your poor dentition and dental caries.


Scripts


Hydrocodone Bit/Acetaminophen (HYDROCODONE-APAP 5-325  **) 1 Each Tablet


1 TAB PO PRN Q6HRS PRN for PAIN, #5 TAB 0 Refills


   Prov: AMOR CHIN DO         10/16/21











AMOR CHIN DO                 Oct 16, 2021 07:46

## 2021-12-11 ENCOUNTER — HOSPITAL ENCOUNTER (EMERGENCY)
Dept: HOSPITAL 63 - ER | Age: 41
Discharge: HOME | End: 2021-12-11
Payer: SELF-PAY

## 2021-12-11 VITALS
DIASTOLIC BLOOD PRESSURE: 84 MMHG | SYSTOLIC BLOOD PRESSURE: 150 MMHG | BODY MASS INDEX: 25.22 KG/M2 | HEIGHT: 64 IN | WEIGHT: 147.71 LBS

## 2021-12-11 DIAGNOSIS — F41.9: ICD-10-CM

## 2021-12-11 DIAGNOSIS — Z88.5: ICD-10-CM

## 2021-12-11 DIAGNOSIS — U07.1: Primary | ICD-10-CM

## 2021-12-11 DIAGNOSIS — R19.7: ICD-10-CM

## 2021-12-11 DIAGNOSIS — I10: ICD-10-CM

## 2021-12-11 DIAGNOSIS — Z88.6: ICD-10-CM

## 2021-12-11 NOTE — PHYS DOC
Past History


Past Medical History:  Anxiety, Hypertension, Hyperthyroid


 (DIDIERTEDDY APRN)


Past Surgical History:  No Surgical History, 


 (BRAYDONTEDDY PATTERSON APRN)


Alcohol Use:  None


Drug Use:  None


 (DIDIERTEDDY JUANITA APRN)





Adult General


Chief Complaint


Chief Complaint:  GENERALIZED BODY ACHES





[facility]


HPI





Patient is a 41-year-old female patient with history of anxiety, hypertension, 

who presents to the ED today complaining of diarrhea, body aches and subjective 

fevers, symptoms began today.  Patient states she did a home Covid test which 

was positive.  She is in the ED with the boyfriend who is also positive.  

Patient denies any shortness of breath.


 (TEDDY VELÁSQUEZ JUANITA APRN)





Review of Systems


Review of Systems





Constitutional: Reports subjective fevers, body aches


Eyes: Denies change in visual acuity, redness, or eye pain []


HENT: Denies nasal congestion or sore throat []


Respiratory: Denies cough or shortness of breath []


Cardiovascular: No additional information not addressed in HPI []


GI: Reports diarrhea.  Denies abdominal pain, nausea, vomiting, bloody stools 


: Denies dysuria or hematuria []


Musculoskeletal: Denies back pain or joint pain []


Integument: Denies rash or skin lesions []


Neurologic: Denies headache, focal weakness or sensory changes []








All other systems were reviewed and found to be within normal limits, except as 

documented in this note.


 (BRAYDONYESENIATEDDY JUANITA APRN)





Allergies


Allergies





Allergies








Coded Allergies Type Severity Reaction Last Updated Verified


 


  acetaminophen Allergy Unknown rash 10/8/17 Yes


 


  oxycodone Allergy Unknown rash 10/8/17 Yes








 (BRAYDONTEDDY PATTERSON APRN)





Physical Exam


Physical Exam





Constitutional: Well developed, well nourished, no acute distress, non-toxic 

appearance. []


HENT: Normocephalic, atraumatic, bilateral external ears normal, oropharynx 

moist, no oral exudates, nose normal. []


Eyes: PERRLA, EOMI, conjunctiva normal, no discharge. [] 


Neck: Normal range of motion, no tenderness, supple, no stridor. [] 


Cardiovascular:Heart rate regular rhythm, no murmur []


Lungs & Thorax:  Bilateral breath sounds clear to auscultation []


Abdomen: Bowel sounds normal, soft, no tenderness, no masses, no pulsatile 

masses. [] 


Skin: Warm, dry, no erythema, no rash. [] 


Back: No tenderness, no CVA tenderness. [] 


Extremities: No tenderness, no cyanosis, no clubbing, ROM intact, no edema. [] 


Neurologic: Alert and oriented X 3, normal motor function, normal sensory 

function, no focal deficits noted. []


Psychologic: Affect normal, judgement normal, mood normal. []


 (TEDDY VELÁSQUEZ)





Current Patient Data


Vital Signs





                                   Vital Signs








  Date Time  Temp Pulse Resp B/P (MAP) Pulse Ox O2 Delivery O2 Flow Rate FiO2


 


21 13:20 98.1 84  150/84 (106) 99 Room Air  


 


21 13:20   18     








 (TEDDY VELÁSQUEZ)





EKG


EKG


[]


 (TEDDY VELÁSQUEZ)





Radiology/Procedures


Radiology/Procedures


[]


 (TEDDY VELÁSQUEZ)





Heart Score


C/O Chest Pain:  N/A


Risk Factors:


Risk Factors:  DM, Current or recent (<one month) smoker, HTN, HLP, family 

history of CAD, obesity.


Risk Scores:


Risk Factors:  DM, Current or recent (<one month) smoker, HTN, HLP, family 

history of CAD, obesity.


 (TEDDY VELÁSQUEZ)





Course & Med Decision Making


Course & Med Decision Making


Pertinent Labs and Imaging studies reviewed. (See chart for details)





This a 41-year-old female patient presenting to the ED today with diarrhea, 

subjective fevers and body aches, symptoms began today, patient had a home Covid

 test +3 days ago which was positive.





Supportive care measures recommended.  Discharge to home.  Provided return 

precautions





 (TEDDY VELÁSQUEZ)





Dragon Disclaimer


Dragon Disclaimer


This electronic medical record was generated, in whole or in part, using a voice

 recognition dictation system.


 (TEDDY VELÁSQUEZ)


Attending Co-Sign


The patient was seen and interviewed as well as examined at the bedside. The 

chart was reviewed. The case was discussed. Agree with the plan of care.


 (SANDRA KELLEY DO)





Departure


Departure:


Impression:  


   Primary Impression:  


   COVID-19 virus RNA test result positive at limit of detection


   Additional Impressions:  


   Diarrhea


   Body aches


Disposition:  01 HOME / SELF CARE / HOMELESS


Condition:  STABLE


Referrals:  


PCP,NO (PCP)


follow up with your doctor next week


Patient Instructions:  Diarrhea, Easy-to-Read, Viral Syndrome





Additional Instructions:  


You were evaluated in the emergency room with Covid symptoms.  We encourage you 

to rest, push fluids, take Tylenol or Motrin for pain or fever. Push fluids. 

Follow up with your doctor in one week





Problem Qualifiers








   Additional Impressions:  


   Diarrhea


   Diarrhea type:  unspecified type  Qualified Codes:  R19.7 - Diarrhea, unspec

   ified








TEDDY VELÁSQUEZ            Dec 11, 2021 14:05


SANDRA KELLEY DO                 Dec 12, 2021 14:33